# Patient Record
Sex: FEMALE | Race: WHITE | NOT HISPANIC OR LATINO | Employment: OTHER | URBAN - METROPOLITAN AREA
[De-identification: names, ages, dates, MRNs, and addresses within clinical notes are randomized per-mention and may not be internally consistent; named-entity substitution may affect disease eponyms.]

---

## 2018-01-29 PROBLEM — G47.00 INSOMNIA: Status: ACTIVE | Noted: 2018-01-29

## 2018-01-29 PROBLEM — L98.9 SKIN LESION: Status: ACTIVE | Noted: 2018-01-29

## 2018-01-29 PROBLEM — F32.A DEPRESSION: Status: ACTIVE | Noted: 2018-01-29

## 2018-01-29 PROBLEM — H61.21 CERUMEN DEBRIS ON TYMPANIC MEMBRANE OF RIGHT EAR: Status: ACTIVE | Noted: 2018-01-29

## 2018-01-29 PROBLEM — Z86.19 H/O HERPES LABIALIS: Status: ACTIVE | Noted: 2018-01-29

## 2018-01-29 PROBLEM — Z12.39 SCREENING FOR BREAST CANCER: Status: ACTIVE | Noted: 2018-01-29

## 2018-01-29 PROBLEM — D22.9 MULTIPLE NEVI: Status: ACTIVE | Noted: 2018-01-29

## 2018-01-29 PROBLEM — L70.9 ADULT ACNE: Status: ACTIVE | Noted: 2018-01-29

## 2019-07-08 ENCOUNTER — PATIENT OUTREACH (OUTPATIENT)
Dept: HEALTH INFORMATION MANAGEMENT | Facility: OTHER | Age: 69
End: 2019-07-08

## 2019-07-08 NOTE — PROGRESS NOTES
Outcome: Left Message     Please transfer to Patient Outreach Team at [phone] when patient returns call.    Attempt # 1

## 2019-07-16 NOTE — PROGRESS NOTES
Outcome: Left Message    Please transfer to Patient Outreach Team at [phone] when patient returns call.    Attempt # 2

## 2019-07-23 NOTE — PROGRESS NOTES
Outcome: Left Message    Please transfer to Patient Outreach Team at [phone] when patient returns call.    Attempt # 3